# Patient Record
(demographics unavailable — no encounter records)

---

## 2024-11-09 NOTE — HISTORY OF PRESENT ILLNESS
[Doing Well] : Patient is doing well [Robertifestyle management] : lifestyle management [FreeTextEntry1] : weight is now stable again and has regained some of what she has lost, eating very healthy, Minimal salt in her diet which was a part of her prior diet with BP now improved- now stabilized. continue healthy eating habits and regular exercise as before.  lipids -  SVT- stable with metoprolol and only very rare palpitations now. stress level also under better control which has helped

## 2024-11-09 NOTE — REVIEW OF SYSTEMS
[Recent Change In Weight] : ~T recent weight change [Hoarseness] : hoarseness [Nasal Discharge] : nasal discharge [Postnasal Drip] : postnasal drip [Joint Pain] : joint pain [Negative] : Psychiatric [Fever] : no fever [Shortness Of Breath] : no shortness of breath [Wheezing] : no wheezing [Cough] : no cough [Dyspnea on Exertion] : no dyspnea on exertion [FreeTextEntry2] : weight loss unintentional but appetite has recently improved [FreeTextEntry5] : very rare palpitations now down to almost none [FreeTextEntry9] : knee pains - using topicals no further injections in joints since the knee injection  [de-identified] : specifically no headaches or dizziness

## 2024-11-09 NOTE — ASSESSMENT
[FreeTextEntry1] : multiple medical problems- review of thyroid US and report, review of cardiology visit as well as head and neck surgery visit for thyroid goiter- stable, annual US to monitor orders placed for labs and follow up visit in 3 months to include flu vaccine  moderate level MDM and

## 2024-11-09 NOTE — PHYSICAL EXAM
[No Acute Distress] : no acute distress [Well Nourished] : well nourished [Normal Sclera/Conjunctiva] : normal sclera/conjunctiva [No JVD] : no jugular venous distention [No Respiratory Distress] : no respiratory distress  [No Accessory Muscle Use] : no accessory muscle use [Clear to Auscultation] : lungs were clear to auscultation bilaterally [Normal Rate] : normal rate  [Regular Rhythm] : with a regular rhythm [Normal S1, S2] : normal S1 and S2 [No Murmur] : no murmur heard [Pedal Pulses Present] : the pedal pulses are present [No Edema] : there was no peripheral edema [Normal Appearance] : normal in appearance [No Masses] : no palpable masses [No Nipple Discharge] : no nipple discharge [No Axillary Lymphadenopathy] : no axillary lymphadenopathy [No Rash] : no rash [No Focal Deficits] : no focal deficits [Normal Gait] : normal gait [Normal Affect] : the affect was normal [Alert and Oriented x3] : oriented to person, place, and time [Normal Insight/Judgement] : insight and judgment were intact [de-identified] : mild thyromegaly unchanged  [de-identified] : defer [FreeTextEntry1] : defer

## 2024-11-09 NOTE — PHYSICAL EXAM
[No Acute Distress] : no acute distress [Well Nourished] : well nourished [Normal Sclera/Conjunctiva] : normal sclera/conjunctiva [No JVD] : no jugular venous distention [No Respiratory Distress] : no respiratory distress  [No Accessory Muscle Use] : no accessory muscle use [Clear to Auscultation] : lungs were clear to auscultation bilaterally [Normal Rate] : normal rate  [Regular Rhythm] : with a regular rhythm [Normal S1, S2] : normal S1 and S2 [No Murmur] : no murmur heard [Pedal Pulses Present] : the pedal pulses are present [No Edema] : there was no peripheral edema [Normal Appearance] : normal in appearance [No Masses] : no palpable masses [No Nipple Discharge] : no nipple discharge [No Axillary Lymphadenopathy] : no axillary lymphadenopathy [No Rash] : no rash [No Focal Deficits] : no focal deficits [Normal Gait] : normal gait [Normal Affect] : the affect was normal [Alert and Oriented x3] : oriented to person, place, and time [Normal Insight/Judgement] : insight and judgment were intact [de-identified] : mild thyromegaly unchanged  [de-identified] : defer [FreeTextEntry1] : defer

## 2024-11-09 NOTE — REVIEW OF SYSTEMS
Patient updated on follow-up and lab ordered.   PSR to schedule lab visit.     [Recent Change In Weight] : ~T recent weight change [Hoarseness] : hoarseness [Nasal Discharge] : nasal discharge [Postnasal Drip] : postnasal drip [Joint Pain] : joint pain [Negative] : Psychiatric [Fever] : no fever [Shortness Of Breath] : no shortness of breath [Wheezing] : no wheezing [Cough] : no cough [Dyspnea on Exertion] : no dyspnea on exertion [FreeTextEntry2] : weight loss unintentional but appetite has recently improved [FreeTextEntry5] : very rare palpitations now down to almost none [FreeTextEntry9] : knee pains - using topicals no further injections in joints since the knee injection  [de-identified] : specifically no headaches or dizziness

## 2025-02-13 NOTE — HEALTH RISK ASSESSMENT
[Very Good] : ~his/her~  mood as very good [Never] : Never [NO] : No [HIV test declined] : HIV test declined [None] : None [Alone] : lives alone [# of Members in Household ___] :  household currently consist of [unfilled] member(s) [Retired] : retired [College] : College [] :  [# Of Children ___] : has [unfilled] children [Feels Safe at Home] : Feels safe at home [Fully functional (bathing, dressing, toileting, transferring, walking, feeding)] : Fully functional (bathing, dressing, toileting, transferring, walking, feeding) [Fully functional (using the telephone, shopping, preparing meals, housekeeping, doing laundry, using] : Fully functional and needs no help or supervision to perform IADLs (using the telephone, shopping, preparing meals, housekeeping, doing laundry, using transportation, managing medications and managing finances) [Reports changes in vision] : Reports changes in vision [Reports normal functional visual acuity (ie: able to read med bottle)] : Reports normal functional visual acuity [Smoke Detector] : smoke detector [Carbon Monoxide Detector] : carbon monoxide detector [Safety elements used in home] : safety elements used in home [Seat Belt] :  uses seat belt [Sunscreen] : uses sunscreen [With Patient/Caregiver] : , with patient/caregiver [Designated Healthcare Proxy] : Designated healthcare proxy [Name: ___] : Health Care Proxy's Name: [unfilled]  [Relationship: ___] : Relationship: [unfilled] [FreeTextEntry1] : no real issues at this time as palpitations have calmed down and arenot currently an issue [de-identified] : cardiology- notes reviewed, ent surgery notes reviewed relat to thyroid nodule  [de-identified] : walks regularly in good weather not as much in the colder weather or in snow etc [de-identified] : healthy diet with reduced slat, no caffeine and no signficiant simple carbs [Change in mental status noted] : No change in mental status noted [Sexually Active] : not sexually active [Reports changes in hearing] : Reports no changes in hearing [Reports changes in dental health] : Reports no changes in dental health [de-identified] : none [AdvancecareDate] : 2/25 [FreeTextEntry4] : no changes to this proxy

## 2025-02-13 NOTE — PHYSICAL EXAM
[No Acute Distress] : no acute distress [Well Nourished] : well nourished [Normal Sclera/Conjunctiva] : normal sclera/conjunctiva [PERRL] : pupils equal round and reactive to light [Fundoscopic Exam Performed] : fundoscopic ~T exam ~C was performed [Normal Outer Ear/Nose] : the outer ears and nose were normal in appearance [Normal Oropharynx] : the oropharynx was normal [Normal TMs] : both tympanic membranes were normal [No JVD] : no jugular venous distention [No Respiratory Distress] : no respiratory distress  [No Accessory Muscle Use] : no accessory muscle use [Clear to Auscultation] : lungs were clear to auscultation bilaterally [Normal Rate] : normal rate  [Regular Rhythm] : with a regular rhythm [Normal S1, S2] : normal S1 and S2 [No Murmur] : no murmur heard [Pedal Pulses Present] : the pedal pulses are present [No Edema] : there was no peripheral edema [Normal Appearance] : normal in appearance [No Masses] : no palpable masses [No Nipple Discharge] : no nipple discharge [No Axillary Lymphadenopathy] : no axillary lymphadenopathy [Soft] : abdomen soft [Non Tender] : non-tender [Non-distended] : non-distended [No HSM] : no HSM [Normal Supraclavicular Nodes] : no supraclavicular lymphadenopathy [Normal Posterior Cervical Nodes] : no posterior cervical lymphadenopathy [Normal Anterior Cervical Nodes] : no anterior cervical lymphadenopathy [No CVA Tenderness] : no CVA  tenderness [No Spinal Tenderness] : no spinal tenderness [No Rash] : no rash [No Focal Deficits] : no focal deficits [Normal Gait] : normal gait [Normal Affect] : the affect was normal [Alert and Oriented x3] : oriented to person, place, and time [Normal Insight/Judgement] : insight and judgment were intact [de-identified] : mild thyromegaly unchanged - left sided nodule [FreeTextEntry1] : defer [de-identified] : defer

## 2025-02-13 NOTE — REVIEW OF SYSTEMS
[Vision Problems] : vision problems [Anxiety] : anxiety [Negative] : Heme/Lymph [Suicidal] : not suicidal [Insomnia] : no insomnia [Depression] : no depression [FreeTextEntry3] : retinal hemorrhages with reduced vision but no additional insults recently -follows up regularly- last eye exam was good and will continue follow up [FreeTextEntry5] : palpitations have resolved for the most part- no further change to medication [de-identified] : anxiety is a little better and she has been more silent regarding the

## 2025-02-13 NOTE — PHYSICAL EXAM
[No Acute Distress] : no acute distress [Well Nourished] : well nourished [Normal Sclera/Conjunctiva] : normal sclera/conjunctiva [PERRL] : pupils equal round and reactive to light [Fundoscopic Exam Performed] : fundoscopic ~T exam ~C was performed [Normal Outer Ear/Nose] : the outer ears and nose were normal in appearance [Normal Oropharynx] : the oropharynx was normal [Normal TMs] : both tympanic membranes were normal [No JVD] : no jugular venous distention [No Respiratory Distress] : no respiratory distress  [No Accessory Muscle Use] : no accessory muscle use [Clear to Auscultation] : lungs were clear to auscultation bilaterally [Normal Rate] : normal rate  [Regular Rhythm] : with a regular rhythm [Normal S1, S2] : normal S1 and S2 [No Murmur] : no murmur heard [Pedal Pulses Present] : the pedal pulses are present [No Edema] : there was no peripheral edema [Normal Appearance] : normal in appearance [No Masses] : no palpable masses [No Nipple Discharge] : no nipple discharge [No Axillary Lymphadenopathy] : no axillary lymphadenopathy [Soft] : abdomen soft [Non Tender] : non-tender [Non-distended] : non-distended [No HSM] : no HSM [Normal Supraclavicular Nodes] : no supraclavicular lymphadenopathy [Normal Posterior Cervical Nodes] : no posterior cervical lymphadenopathy [Normal Anterior Cervical Nodes] : no anterior cervical lymphadenopathy [No CVA Tenderness] : no CVA  tenderness [No Spinal Tenderness] : no spinal tenderness [No Rash] : no rash [No Focal Deficits] : no focal deficits [Normal Gait] : normal gait [Normal Affect] : the affect was normal [Alert and Oriented x3] : oriented to person, place, and time [Normal Insight/Judgement] : insight and judgment were intact [de-identified] : mild thyromegaly unchanged - left sided nodule [FreeTextEntry1] : defer [de-identified] : defer

## 2025-02-13 NOTE — REVIEW OF SYSTEMS
[Vision Problems] : vision problems [Anxiety] : anxiety [Negative] : Heme/Lymph [Suicidal] : not suicidal [Insomnia] : no insomnia [Depression] : no depression [FreeTextEntry3] : retinal hemorrhages with reduced vision but no additional insults recently -follows up regularly- last eye exam was good and will continue follow up [FreeTextEntry5] : palpitations have resolved for the most part- no further change to medication [de-identified] : anxiety is a little better and she has been more silent regarding the

## 2025-02-13 NOTE — HEALTH RISK ASSESSMENT
[Very Good] : ~his/her~  mood as very good [Never] : Never [NO] : No [HIV test declined] : HIV test declined [None] : None [Alone] : lives alone [# of Members in Household ___] :  household currently consist of [unfilled] member(s) [Retired] : retired [College] : College [] :  [# Of Children ___] : has [unfilled] children [Feels Safe at Home] : Feels safe at home [Fully functional (bathing, dressing, toileting, transferring, walking, feeding)] : Fully functional (bathing, dressing, toileting, transferring, walking, feeding) [Fully functional (using the telephone, shopping, preparing meals, housekeeping, doing laundry, using] : Fully functional and needs no help or supervision to perform IADLs (using the telephone, shopping, preparing meals, housekeeping, doing laundry, using transportation, managing medications and managing finances) [Reports changes in vision] : Reports changes in vision [Reports normal functional visual acuity (ie: able to read med bottle)] : Reports normal functional visual acuity [Smoke Detector] : smoke detector [Carbon Monoxide Detector] : carbon monoxide detector [Safety elements used in home] : safety elements used in home [Seat Belt] :  uses seat belt [Sunscreen] : uses sunscreen [With Patient/Caregiver] : , with patient/caregiver [Designated Healthcare Proxy] : Designated healthcare proxy [Name: ___] : Health Care Proxy's Name: [unfilled]  [Relationship: ___] : Relationship: [unfilled] [FreeTextEntry1] : no real issues at this time as palpitations have calmed down and arenot currently an issue [de-identified] : cardiology- notes reviewed, ent surgery notes reviewed relat to thyroid nodule  [de-identified] : walks regularly in good weather not as much in the colder weather or in snow etc [de-identified] : healthy diet with reduced slat, no caffeine and no signficiant simple carbs [Change in mental status noted] : No change in mental status noted [Sexually Active] : not sexually active [Reports changes in hearing] : Reports no changes in hearing [Reports changes in dental health] : Reports no changes in dental health [de-identified] : none [AdvancecareDate] : 2/25 [FreeTextEntry4] : no changes to this proxy

## 2025-02-13 NOTE — ASSESSMENT
[FreeTextEntry1] : reviewed all vaccines including covid, flu, rsv and shingrix mammogram results reviewed reviewed follow up of thyroid nodule and discussed ongoing follow up as planned and scheduled most recent labs reviewed- no need for additional labs at this time reviewed education on diet and regular walking activity

## 2025-03-24 NOTE — PHYSICAL EXAM
[Well Developed] : well developed [Well Nourished] : well nourished [No Acute Distress] : no acute distress [Normal Conjunctiva] : normal conjunctiva [Normal Venous Pressure] : normal venous pressure [No Carotid Bruit] : no carotid bruit [Normal S1, S2] : normal S1, S2 [No Rub] : no rub [No Gallop] : no gallop [Murmur] : murmur [Clear Lung Fields] : clear lung fields [Good Air Entry] : good air entry [No Respiratory Distress] : no respiratory distress  [Normal Gait] : normal gait [No Edema] : no edema [No Cyanosis] : no cyanosis [No Clubbing] : no clubbing [Moves all extremities] : moves all extremities [No Focal Deficits] : no focal deficits [Normal Speech] : normal speech [Alert and Oriented] : alert and oriented [Normal memory] : normal memory [de-identified] : Grade 2 systolic murmur left upper sternal border

## 2025-03-24 NOTE — HISTORY OF PRESENT ILLNESS
[FreeTextEntry1] : Follow-up visit overall has been doing well rare palpitations which are not bothersome.  No chest pain or significant dyspnea.  Has some fatigue admits to sleeping poorly.

## 2025-03-24 NOTE — ASSESSMENT
[FreeTextEntry1] : 87-year-old with  possible hypertension.  History of ablation remotely.   Mild MR on last echo. PVCs asymptomatic at this time. Fatigue possibly related to medication or poor sleep

## 2025-03-24 NOTE — DISCUSSION/SUMMARY
[Patient] : the patient [Risks] : risks [Benefits] : benefits [Alternatives] : alternatives [___ Month(s)] : in [unfilled] month(s) [FreeTextEntry1] : Metoprolol dose will be reduced to 12.5 mg she will see if she feels any better on lower dose she is aware she may have increased palpitations however.  Medical follow-up with PMD see me again in 6 months follow-up echo anticipated regarding fatigue and mitral regurgitation Blood pressure monitoring and low-sodium diet discussed. [EKG obtained to assist in diagnosis and management of assessed problem(s)] : EKG obtained to assist in diagnosis and management of assessed problem(s)

## 2025-03-24 NOTE — CARDIOLOGY SUMMARY
[de-identified] : September 18, 2023 sinus rhythm March 18, 2024 sinus rhythm September 23, 2024 sinus WNL March 24, 2025 sinus rhythm [de-identified] : 2022 no ischemia [de-identified] : 2022 moderate MR 2023 mild MR moderate TR normal LV [de-identified] : Ablation 2011

## 2025-05-15 NOTE — ASSESSMENT
[FreeTextEntry1] : 87-year-old with probable hypertension.  History of ablation remotely.   Mild MR on last echo. PVCs asymptomatic at this time.  Dietary nonadherence

## 2025-05-15 NOTE — PHYSICAL EXAM
[Well Developed] : well developed [Well Nourished] : well nourished [No Acute Distress] : no acute distress [Normal Conjunctiva] : normal conjunctiva [Normal Venous Pressure] : normal venous pressure [No Carotid Bruit] : no carotid bruit [Normal S1, S2] : normal S1, S2 [No Rub] : no rub [No Gallop] : no gallop [Murmur] : murmur [Clear Lung Fields] : clear lung fields [Good Air Entry] : good air entry [No Respiratory Distress] : no respiratory distress  [Normal Gait] : normal gait [No Edema] : no edema [No Cyanosis] : no cyanosis [No Clubbing] : no clubbing [Moves all extremities] : moves all extremities [No Focal Deficits] : no focal deficits [Normal Speech] : normal speech [Alert and Oriented] : alert and oriented [Normal memory] : normal memory [de-identified] : Grade 2 systolic murmur left upper sternal border

## 2025-05-15 NOTE — DISCUSSION/SUMMARY
[Patient] : the patient [Risks] : risks [Benefits] : benefits [Alternatives] : alternatives [___ Month(s)] : in [unfilled] month(s) [FreeTextEntry1] : Discussed low-sodium diet handout given.  Initiate low-dose hydrochlorothiazide continue her beta-blocker monitor BP at home and follow-up in few months.  Follow-up with PMD as well.

## 2025-05-15 NOTE — HISTORY OF PRESENT ILLNESS
[FreeTextEntry1] : Indicates is having a retinal issue blood pressures have been elevated okay for metoprolol with improvement.  Admits to eating a fair amount of salty and prepared foods.  No chest pain dyspnea palpitations

## 2025-05-15 NOTE — CARDIOLOGY SUMMARY
[de-identified] : September 18, 2023 sinus rhythm March 18, 2024 sinus rhythm September 23, 2024 sinus WNL March 24, 2025 sinus rhythm [de-identified] : 2022 no ischemia [de-identified] : 2022 moderate MR 2023 mild MR moderate TR normal LV [de-identified] : Ablation 2011

## 2025-05-19 NOTE — HISTORY OF PRESENT ILLNESS
[FreeTextEntry8] : CC: changes in the vision of her right eye with redness but no significant pain, seems to have some pain at the temples bilaterally as well but she denies actual headache. She noted these symptoms last week and had worsening vision from her macular degeneration especially in the right eye. She was able to be seen by both her ophthalmologists and was advised to be seen here. She did see cardiology and was found to have elevated BP, more than usual and was begun on some hctz with no side effects but she does not have any other sx including no chest pain, or recent palpitations, she does have her exertional dyspnea still but not worse.

## 2025-05-19 NOTE — PHYSICAL EXAM
[No Acute Distress] : no acute distress [Normal Sclera/Conjunctiva] : normal sclera/conjunctiva [PERRL] : pupils equal round and reactive to light [EOMI] : extraocular movements intact [Fundoscopic Exam Performed] : fundoscopic ~T exam ~C was performed [Normal Outer Ear/Nose] : the outer ears and nose were normal in appearance [No JVD] : no jugular venous distention [No Lymphadenopathy] : no lymphadenopathy [No Focal Deficits] : no focal deficits [Normal Gait] : normal gait [Normal Affect] : the affect was normal [Alert and Oriented x3] : oriented to person, place, and time [Normal Insight/Judgement] : insight and judgment were intact [de-identified] : non-tender at the temples

## 2025-05-19 NOTE — REVIEW OF SYSTEMS
[Redness] : redness [Dryness] : dryness  [Vision Problems] : vision problems [Dyspnea on Exertion] : dyspnea on exertion [Negative] : Psychiatric [Discharge] : no discharge [Pain] : no pain [Itching] : no itching [Chest Pain] : no chest pain [Palpitations] : no palpitations [Leg Claudication] : no leg claudication [Lower Ext Edema] : no lower extremity edema [Orthopnea] : no orthopnea [Paroxysmal Nocturnal Dyspnea] : no paroxysmal nocturnal dyspnea [Wheezing] : no wheezing [Cough] : no cough [FreeTextEntry3] : dx with iritis, has ARMD [FreeTextEntry9] : none

## 2025-05-19 NOTE — ASSESSMENT
[FreeTextEntry1] : evaluation of recent visit notes, rule out giant cell arteritis or other systemic inflammatory condition as cause for the new onset iritis. will obtain ESR and CRP today which were ordered. Will reassess this once completed and decision for any further work up needed

## 2025-05-19 NOTE — PHYSICAL EXAM
[No Acute Distress] : no acute distress [Normal Sclera/Conjunctiva] : normal sclera/conjunctiva [PERRL] : pupils equal round and reactive to light [EOMI] : extraocular movements intact [Fundoscopic Exam Performed] : fundoscopic ~T exam ~C was performed [Normal Outer Ear/Nose] : the outer ears and nose were normal in appearance [No JVD] : no jugular venous distention [No Lymphadenopathy] : no lymphadenopathy [No Focal Deficits] : no focal deficits [Normal Gait] : normal gait [Normal Affect] : the affect was normal [Alert and Oriented x3] : oriented to person, place, and time [Normal Insight/Judgement] : insight and judgment were intact [de-identified] : non-tender at the temples

## 2025-05-19 NOTE — PLAN
[FreeTextEntry1] : time spent in obtaining hx and exam along with the documentation and ordering of labs contiuity of care for this encounter

## 2025-06-26 NOTE — CARDIOLOGY SUMMARY
[de-identified] : September 18, 2023 sinus rhythm March 18, 2024 sinus rhythm September 23, 2024 sinus WNL March 2025 sinus rhythm March 24, 2025 sinus rhythm [de-identified] : 2022 no ischemia [de-identified] : 2022 moderate MR 2023 mild MR moderate TR normal LV [de-identified] : Ablation 2011

## 2025-06-26 NOTE — DISCUSSION/SUMMARY
[Patient] : the patient [Risks] : risks [Benefits] : benefits [Alternatives] : alternatives [___ Month(s)] : in [unfilled] month(s) [FreeTextEntry1] : Discussed low-sodium diet .  Continue low-dose hydrochlorothiazide continue her beta-blocker monitor BP at home and follow-up approximately 6 months follow-up with PMD as well.

## 2025-06-26 NOTE — ASSESSMENT
[FreeTextEntry1] : 87-year-old with improved blood pressure.  History of ablation remotely.   Mild MR on last echo. PVCs asymptomatic at this time.  Improved lipids

## 2025-06-26 NOTE — HISTORY OF PRESENT ILLNESS
[FreeTextEntry1] : Feels very well blood pressure under good control no chest pain dyspnea palpitations or edema

## 2025-06-26 NOTE — PHYSICAL EXAM
[Well Developed] : well developed [Well Nourished] : well nourished [No Acute Distress] : no acute distress [Normal Conjunctiva] : normal conjunctiva [Normal Venous Pressure] : normal venous pressure [No Carotid Bruit] : no carotid bruit [Normal S1, S2] : normal S1, S2 [No Rub] : no rub [No Gallop] : no gallop [Murmur] : murmur [Clear Lung Fields] : clear lung fields [Good Air Entry] : good air entry [No Respiratory Distress] : no respiratory distress  [Normal Gait] : normal gait [No Edema] : no edema [No Cyanosis] : no cyanosis [No Clubbing] : no clubbing [Moves all extremities] : moves all extremities [No Focal Deficits] : no focal deficits [Normal Speech] : normal speech [Alert and Oriented] : alert and oriented [Normal memory] : normal memory [de-identified] : Grade 2 systolic murmur left upper sternal border